# Patient Record
(demographics unavailable — no encounter records)

---

## 2025-06-23 NOTE — PROCEDURE
[FreeTextEntry3] : - Large joint injection was performed of the left subacromial space.  - The indication for this procedure was pain and inflammation. Patient has tried OTC's including aspirin, Ibuprofen, Aleve, etc or prescription NSAIDS, and/or exercises at home and/or physical therapy without satisfactory response, patient had decreased mobility in the joint and the risks benefits, and alternatives have been discussed, and verbal consent was obtained. The site was prepped with alcohol, betadine, ethyl chloride sprayed topically and sterile technique used.  - An injection of Betamethasone 2cc; Marcaine 5cc injected. - Patient was advised to call if redness, pain or fever occur, apply ice for 15 minutes out of every hour for the next 12-24 hours as tolerated and patient was advised to rest the joint(s) for 2 days.  - Ultrasound guidance was indicated for this patient due to prior failure or difficult injection. All ultrasound images have been permanently captured and stored accordingly in our picture archiving and communication system. Visualization of the needle and placement of injection was performed without complication.

## 2025-06-23 NOTE — IMAGING
[de-identified] : Left shoulder exam:  No swelling, no ecchymosis, no josesito deformity Tenderness to palpation over greater tuberosity No instability or tenderness over AC joint Full range of motion with discomfort   5/5 supraspinatus, infraspinatus and subscapularis; there is pain with strength testing Positive Kiser test, positive impingement sign Speeds and yergason negative Motor and sensory intact distally [Left] : left shoulder [There are no fractures, subluxations or dislocations. No significant abnormalities are seen] : There are no fractures, subluxations or dislocations. No significant abnormalities are seen

## 2025-06-23 NOTE — ASSESSMENT
[FreeTextEntry1] : here for subacromial bursitis of left shoulder  received CSI injection in 2024 with significant relief until 1 week ago  will repeat CSI injection left JOSE today - tolerated procedure well - post procedure precautions disucssed instructed to attend PT  use of cryotherapy discussed defers rx anti-inflammatories at this time  rtc prn   I am working today under the supervision of Dr. Loera and I am following the plan of care of Dr. Loera as described by him on this date. Progress note completed by Kourtney Cantu PA-C under the supervision of Dr. Loera.

## 2025-06-23 NOTE — HISTORY OF PRESENT ILLNESS
[de-identified] : 3/8/24: Patient is here left shoulder pain that began in 2022. Saw Dr. Loera for calcific tendinitis. Tried CSI, had relief for almost 2 years. Went to PCP recently due to increased pain within the last 3 months. Attending PT with mobility improvement. Experiences tightness. Denies n/t. No clicking. Worsens with lifting/extending. Pain radiates into triceps, down into fingers. Tried Naprosyn.   4/19/24: here to follow up on left shoulder. Attending PT with some improvement. CSI (SA) from 3/8/24 has been giving relief. Notices discomfort with overhead motion. Takes Naprosyn PRN.   6.23.25 Patient here for left shoulder pain